# Patient Record
Sex: MALE | Race: WHITE | NOT HISPANIC OR LATINO | URBAN - METROPOLITAN AREA
[De-identification: names, ages, dates, MRNs, and addresses within clinical notes are randomized per-mention and may not be internally consistent; named-entity substitution may affect disease eponyms.]

---

## 2017-02-16 ENCOUNTER — INPATIENT (INPATIENT)
Facility: HOSPITAL | Age: 82
LOS: 3 days | Discharge: HOME | DRG: 378 | End: 2017-02-20
Attending: STUDENT IN AN ORGANIZED HEALTH CARE EDUCATION/TRAINING PROGRAM | Admitting: STUDENT IN AN ORGANIZED HEALTH CARE EDUCATION/TRAINING PROGRAM
Payer: COMMERCIAL

## 2017-02-16 VITALS
OXYGEN SATURATION: 97 % | SYSTOLIC BLOOD PRESSURE: 118 MMHG | DIASTOLIC BLOOD PRESSURE: 68 MMHG | RESPIRATION RATE: 16 BRPM | TEMPERATURE: 98 F | HEART RATE: 70 BPM

## 2017-02-16 DIAGNOSIS — F03.90 UNSPECIFIED DEMENTIA, UNSPECIFIED SEVERITY, WITHOUT BEHAVIORAL DISTURBANCE, PSYCHOTIC DISTURBANCE, MOOD DISTURBANCE, AND ANXIETY: ICD-10-CM

## 2017-02-16 DIAGNOSIS — Z41.8 ENCOUNTER FOR OTHER PROCEDURES FOR PURPOSES OTHER THAN REMEDYING HEALTH STATE: ICD-10-CM

## 2017-02-16 DIAGNOSIS — I10 ESSENTIAL (PRIMARY) HYPERTENSION: ICD-10-CM

## 2017-02-16 DIAGNOSIS — Z66 DO NOT RESUSCITATE: ICD-10-CM

## 2017-02-16 DIAGNOSIS — I48.91 UNSPECIFIED ATRIAL FIBRILLATION: ICD-10-CM

## 2017-02-16 DIAGNOSIS — K92.2 GASTROINTESTINAL HEMORRHAGE, UNSPECIFIED: ICD-10-CM

## 2017-02-16 DIAGNOSIS — N40.0 BENIGN PROSTATIC HYPERPLASIA WITHOUT LOWER URINARY TRACT SYMPTOMS: ICD-10-CM

## 2017-02-16 DIAGNOSIS — R63.8 OTHER SYMPTOMS AND SIGNS CONCERNING FOOD AND FLUID INTAKE: ICD-10-CM

## 2017-02-16 DIAGNOSIS — Z95.0 PRESENCE OF CARDIAC PACEMAKER: Chronic | ICD-10-CM

## 2017-02-16 DIAGNOSIS — E78.5 HYPERLIPIDEMIA, UNSPECIFIED: ICD-10-CM

## 2017-02-16 LAB
ALBUMIN SERPL ELPH-MCNC: 1.3 G/DL — LOW (ref 3.4–5)
ALBUMIN SERPL ELPH-MCNC: 2.7 G/DL — LOW (ref 3.4–5)
ALP SERPL-CCNC: 34 U/L — LOW (ref 40–120)
ALP SERPL-CCNC: 70 U/L — SIGNIFICANT CHANGE UP (ref 40–120)
ALT FLD-CCNC: 10 U/L — LOW (ref 12–42)
ALT FLD-CCNC: 22 U/L — SIGNIFICANT CHANGE UP (ref 12–42)
ANION GAP SERPL CALC-SCNC: 11 MMOL/L — SIGNIFICANT CHANGE UP (ref 9–16)
ANION GAP SERPL CALC-SCNC: 8 MMOL/L — LOW (ref 9–16)
APTT BLD: 26.4 SEC — LOW (ref 27.5–37.4)
APTT BLD: 30.6 SEC — SIGNIFICANT CHANGE UP (ref 27.5–37.4)
AST SERPL-CCNC: 10 U/L — LOW (ref 15–37)
AST SERPL-CCNC: 15 U/L — SIGNIFICANT CHANGE UP (ref 15–37)
BILIRUB SERPL-MCNC: 0.3 MG/DL — SIGNIFICANT CHANGE UP (ref 0.2–1.2)
BILIRUB SERPL-MCNC: 0.3 MG/DL — SIGNIFICANT CHANGE UP (ref 0.2–1.2)
BLD GP AB SCN SERPL QL: NEGATIVE — SIGNIFICANT CHANGE UP
BUN SERPL-MCNC: 13 MG/DL — SIGNIFICANT CHANGE UP (ref 7–23)
BUN SERPL-MCNC: 24 MG/DL — HIGH (ref 7–23)
CALCIUM SERPL-MCNC: 8.4 MG/DL — LOW (ref 8.5–10.5)
CALCIUM SERPL-MCNC: <5 MG/DL — CRITICAL LOW (ref 8.5–10.5)
CHLORIDE SERPL-SCNC: 108 MMOL/L — SIGNIFICANT CHANGE UP (ref 96–108)
CHLORIDE SERPL-SCNC: 126 MMOL/L — HIGH (ref 96–108)
CO2 SERPL-SCNC: 15 MMOL/L — LOW (ref 22–31)
CO2 SERPL-SCNC: 26 MMOL/L — SIGNIFICANT CHANGE UP (ref 22–31)
CREAT SERPL-MCNC: 0.71 MG/DL — SIGNIFICANT CHANGE UP (ref 0.5–1.3)
CREAT SERPL-MCNC: 1.39 MG/DL — HIGH (ref 0.5–1.3)
GLUCOSE SERPL-MCNC: 154 MG/DL — HIGH (ref 70–99)
GLUCOSE SERPL-MCNC: 56 MG/DL — LOW (ref 70–99)
HCT VFR BLD CALC: 26.9 % — LOW (ref 39–50)
HCT VFR BLD CALC: 34.2 % — LOW (ref 39–50)
HCT VFR BLD CALC: 36.4 % — LOW (ref 39–50)
HGB BLD-MCNC: 11.5 G/DL — LOW (ref 13–17)
HGB BLD-MCNC: 12.2 G/DL — LOW (ref 13–17)
HGB BLD-MCNC: 8.5 G/DL — LOW (ref 13–17)
INR BLD: 1.11 — SIGNIFICANT CHANGE UP (ref 0.88–1.16)
INR BLD: 1.39 — HIGH (ref 0.88–1.16)
MCHC RBC-ENTMCNC: 30.6 PG — SIGNIFICANT CHANGE UP (ref 27–34)
MCHC RBC-ENTMCNC: 31.5 PG — SIGNIFICANT CHANGE UP (ref 27–34)
MCHC RBC-ENTMCNC: 31.6 G/DL — LOW (ref 32–36)
MCHC RBC-ENTMCNC: 31.7 PG — SIGNIFICANT CHANGE UP (ref 27–34)
MCHC RBC-ENTMCNC: 33.5 G/DL — SIGNIFICANT CHANGE UP (ref 32–36)
MCHC RBC-ENTMCNC: 33.6 G/DL — SIGNIFICANT CHANGE UP (ref 32–36)
MCV RBC AUTO: 93.7 FL — SIGNIFICANT CHANGE UP (ref 80–100)
MCV RBC AUTO: 94.5 FL — SIGNIFICANT CHANGE UP (ref 80–100)
MCV RBC AUTO: 96.8 FL — SIGNIFICANT CHANGE UP (ref 80–100)
PLATELET # BLD AUTO: 217 K/UL — SIGNIFICANT CHANGE UP (ref 150–400)
PLATELET # BLD AUTO: 312 K/UL — SIGNIFICANT CHANGE UP (ref 150–400)
PLATELET # BLD AUTO: 318 K/UL — SIGNIFICANT CHANGE UP (ref 150–400)
POTASSIUM SERPL-MCNC: 2.3 MMOL/L — CRITICAL LOW (ref 3.5–5.3)
POTASSIUM SERPL-MCNC: 4.1 MMOL/L — SIGNIFICANT CHANGE UP (ref 3.5–5.3)
POTASSIUM SERPL-SCNC: 2.3 MMOL/L — CRITICAL LOW (ref 3.5–5.3)
POTASSIUM SERPL-SCNC: 4.1 MMOL/L — SIGNIFICANT CHANGE UP (ref 3.5–5.3)
PROT SERPL-MCNC: 2.7 G/DL — LOW (ref 6.4–8.2)
PROT SERPL-MCNC: 5.6 G/DL — LOW (ref 6.4–8.2)
PROTHROM AB SERPL-ACNC: 12.3 SEC — SIGNIFICANT CHANGE UP (ref 10–13.1)
PROTHROM AB SERPL-ACNC: 15.5 SEC — HIGH (ref 10–13.1)
RBC # BLD: 2.78 M/UL — LOW (ref 4.2–5.8)
RBC # BLD: 3.65 M/UL — LOW (ref 4.2–5.8)
RBC # BLD: 3.85 M/UL — LOW (ref 4.2–5.8)
RBC # FLD: 13.8 % — SIGNIFICANT CHANGE UP (ref 10.3–16.9)
RBC # FLD: 14.2 % — SIGNIFICANT CHANGE UP (ref 10.3–16.9)
RBC # FLD: 14.4 % — SIGNIFICANT CHANGE UP (ref 10.3–16.9)
RH IG SCN BLD-IMP: POSITIVE — SIGNIFICANT CHANGE UP
SODIUM SERPL-SCNC: 142 MMOL/L — SIGNIFICANT CHANGE UP (ref 135–145)
SODIUM SERPL-SCNC: 152 MMOL/L — HIGH (ref 135–145)
WBC # BLD: 11.2 K/UL — HIGH (ref 3.8–10.5)
WBC # BLD: 6.3 K/UL — SIGNIFICANT CHANGE UP (ref 3.8–10.5)
WBC # BLD: 8.3 K/UL — SIGNIFICANT CHANGE UP (ref 3.8–10.5)
WBC # FLD AUTO: 11.2 K/UL — HIGH (ref 3.8–10.5)
WBC # FLD AUTO: 6.3 K/UL — SIGNIFICANT CHANGE UP (ref 3.8–10.5)
WBC # FLD AUTO: 8.3 K/UL — SIGNIFICANT CHANGE UP (ref 3.8–10.5)

## 2017-02-16 PROCEDURE — 99223 1ST HOSP IP/OBS HIGH 75: CPT

## 2017-02-16 PROCEDURE — 93010 ELECTROCARDIOGRAM REPORT: CPT

## 2017-02-16 PROCEDURE — 99285 EMERGENCY DEPT VISIT HI MDM: CPT | Mod: 25

## 2017-02-16 PROCEDURE — 71010: CPT | Mod: 26

## 2017-02-16 RX ORDER — METOPROLOL TARTRATE 50 MG
1 TABLET ORAL
Qty: 0 | Refills: 0 | COMMUNITY

## 2017-02-16 RX ORDER — SODIUM CHLORIDE 9 MG/ML
1000 INJECTION INTRAMUSCULAR; INTRAVENOUS; SUBCUTANEOUS
Qty: 0 | Refills: 0 | Status: DISCONTINUED | OUTPATIENT
Start: 2017-02-16 | End: 2017-02-16

## 2017-02-16 RX ORDER — SODIUM CHLORIDE 9 MG/ML
250 INJECTION INTRAMUSCULAR; INTRAVENOUS; SUBCUTANEOUS ONCE
Qty: 0 | Refills: 0 | Status: COMPLETED | OUTPATIENT
Start: 2017-02-16 | End: 2017-02-16

## 2017-02-16 RX ORDER — FINASTERIDE 5 MG/1
1 TABLET, FILM COATED ORAL
Qty: 0 | Refills: 0 | COMMUNITY

## 2017-02-16 RX ORDER — LORATADINE 10 MG/1
1 TABLET ORAL
Qty: 0 | Refills: 0 | COMMUNITY

## 2017-02-16 RX ORDER — ATORVASTATIN CALCIUM 80 MG/1
20 TABLET, FILM COATED ORAL AT BEDTIME
Qty: 0 | Refills: 0 | Status: DISCONTINUED | OUTPATIENT
Start: 2017-02-16 | End: 2017-02-20

## 2017-02-16 RX ORDER — DEXTROSE 50 % IN WATER 50 %
50 SYRINGE (ML) INTRAVENOUS ONCE
Qty: 0 | Refills: 0 | Status: DISCONTINUED | OUTPATIENT
Start: 2017-02-16 | End: 2017-02-16

## 2017-02-16 RX ORDER — POTASSIUM CHLORIDE 20 MEQ
10 PACKET (EA) ORAL
Qty: 0 | Refills: 0 | Status: DISCONTINUED | OUTPATIENT
Start: 2017-02-16 | End: 2017-02-16

## 2017-02-16 RX ORDER — ATORVASTATIN CALCIUM 80 MG/1
1 TABLET, FILM COATED ORAL
Qty: 0 | Refills: 0 | COMMUNITY

## 2017-02-16 RX ORDER — SOD SULF/SODIUM/NAHCO3/KCL/PEG
4000 SOLUTION, RECONSTITUTED, ORAL ORAL ONCE
Qty: 0 | Refills: 0 | Status: COMPLETED | OUTPATIENT
Start: 2017-02-16 | End: 2017-02-16

## 2017-02-16 RX ORDER — POTASSIUM CHLORIDE 20 MEQ
60 PACKET (EA) ORAL ONCE
Qty: 0 | Refills: 0 | Status: DISCONTINUED | OUTPATIENT
Start: 2017-02-16 | End: 2017-02-16

## 2017-02-16 RX ORDER — FINASTERIDE 5 MG/1
5 TABLET, FILM COATED ORAL DAILY
Qty: 0 | Refills: 0 | Status: DISCONTINUED | OUTPATIENT
Start: 2017-02-16 | End: 2017-02-20

## 2017-02-16 RX ORDER — LORATADINE 10 MG/1
10 TABLET ORAL DAILY
Qty: 0 | Refills: 0 | Status: DISCONTINUED | OUTPATIENT
Start: 2017-02-16 | End: 2017-02-20

## 2017-02-16 RX ADMIN — Medication 4000 MILLILITER(S): at 20:53

## 2017-02-16 RX ADMIN — SODIUM CHLORIDE 500 MILLILITER(S): 9 INJECTION INTRAMUSCULAR; INTRAVENOUS; SUBCUTANEOUS at 15:48

## 2017-02-16 RX ADMIN — ATORVASTATIN CALCIUM 20 MILLIGRAM(S): 80 TABLET, FILM COATED ORAL at 22:28

## 2017-02-16 RX ADMIN — SODIUM CHLORIDE 125 MILLILITER(S): 9 INJECTION INTRAMUSCULAR; INTRAVENOUS; SUBCUTANEOUS at 17:27

## 2017-02-16 RX ADMIN — SODIUM CHLORIDE 125 MILLILITER(S): 9 INJECTION INTRAMUSCULAR; INTRAVENOUS; SUBCUTANEOUS at 17:25

## 2017-02-16 NOTE — ED ADULT NURSE NOTE - OBJECTIVE STATEMENT
Patient sent in by PCP for bright red rectal bleed that began this morning per the home health aid. Patient is alert only to self and has a history of dimensia. Patient moving all extremities, BLE weak, sensation intact, no facial droop. HRR, S1S2. Crackles auscultated bilateral lung bases, non-productive wet cough. Patient on oxygen 2L nasal cannula. Abdomen soft, non-distended, non-tender. Bowel sounds present throughout. LBM today per home health aid. Patient incontinent of urine and stool. Patient uses wheelchair at home, and home health aid states patient is ambulatory with assistance. Patient denies pain. Bruising on right upper arm, and stage 2 pressure ulcer to sacrum. Will continue to monitor patient.

## 2017-02-16 NOTE — ED PROVIDER NOTE - PMH
Atrial fibrillation    BPH (benign prostatic hyperplasia)    CAD (coronary artery disease)    Cardiomyopathy    CKD (chronic kidney disease)    Dementia    HLD (hyperlipidemia)    HTN (hypertension)    Prostate CA

## 2017-02-16 NOTE — H&P ADULT. - GASTROINTESTINAL DETAILS
no rebound tenderness/no distention/no rigidity/nontender/soft/bowel sounds normal/no guarding/no masses palpable

## 2017-02-16 NOTE — CONSULT NOTE ADULT - ASSESSMENT
PT is 89M pmh alzheimers, AF on xarelto, bifasicular block with PPM, BPH HTN, CKD p/w BRBPR sent from PMD    #hematochezia - bright red blood with brown stool, otherwise asymptomatic and HD stable. Likely hemorrhoidal given exam, but could also be diverticular. MCV is normal, however could be obscured by nutritional deficiency (micro and macrocytosis concurrently). As he has never been screened, malignancy is also a possiblitiy. Stercoral ulcer is unlikely in setting of soft stool on exam. UGIB is very unlikely given presentation, exam and labs. Discussed with HCP, she is willing to suspend DNR for procedure tomorrow  -hold xarelto  -go-lytely (please make sure patient drinks all of prep, he is unlikely to drink by himself, so make sure he has assistance)  -enemas if not coming out clear  -NPO after midnight (clears OK tonight)

## 2017-02-16 NOTE — CONSULT NOTE ADULT - SUBJECTIVE AND OBJECTIVE BOX
PT is 89M pmh alzheimers, AF on xarelto, bifasicular block with PPM, BPH HTN, CKD p/w BRBPR sent from PMD    He is a poor historian so most of the HPI was obtained from aid. Presented to PMD with rectal bleeding who then sent him to ED for large amounts of red blood. Patient had episode of red blood 1 month ago that was self limited. Otherwise no bleeding this month, although had diarrhea related to admission for UTI where he got ABX. No known record of any colonoscopies or EGD. Patient reports no pain or n/v.    REVIEW OF SYSTEMS:  Constitutional: No fever, weight loss or fatigue  ENMT:  No difficulty hearing, tinnitus, vertigo; No sinus or throat pain  Respiratory: No cough, wheezing, chills or hemoptysis  Cardiovascular: No chest pain, palpitations, dizziness or leg swelling  Gastrointestinal: No abdominal or epigastric pain. No nausea, vomiting or hematemesis; No diarrhea or constipation. No melena or hematochezia.  Skin: No itching, burning, rashes or lesions   Musculoskeletal: No joint pain or swelling; No muscle, back or extremity pain    PAST MEDICAL & SURGICAL HISTORY:      FAMILY HISTORY:      SOCIAL HISTORY:  Smoking Status: [ ] Current, [ ] Former, [ ] Never  Pack Years:    MEDICATIONS:  MEDICATIONS  (STANDING):  sodium chloride 0.9%. 1000milliLiter(s) IV Continuous <Continuous>    MEDICATIONS  (PRN):      Allergies    No Known Allergies    Intolerances        Vital Signs Last 24 Hrs  T(C): 36.9, Max: 36.9 (02-16 @ 14:47)  T(F): 98.4, Max: 98.4 (02-16 @ 14:47)  HR: 70 (70 - 70)  BP: 118/68 (118/68 - 118/68)  BP(mean): --  RR: 16 (16 - 16)  SpO2: 97% (97% - 97%)        PHYSICAL EXAM:    General: Well developed; well nourished; in no acute distress  HEENT: dry MM  Gastrointestinal: Soft, non-tender non-distended; Normal bowel sounds; No rebound or guarding  Rectal, Brown stool with red blood below  Extremities: Normal range of motion, No clubbing, cyanosis or edema  Neurological: Alert and oriented x1-2  Skin: Warm and dry. No obvious rash      LABS:                        8.5    6.3   )-----------( 217      ( 16 Feb 2017 15:10 )             26.9     16 Feb 2017 15:10    152    |  126    |  13     ----------------------------<  56     2.3     |  15     |  0.71     Ca    <5.0       16 Feb 2017 15:10    TPro  2.7    /  Alb  1.3    /  TBili  0.3    /  DBili  x      /  AST  10     /  ALT  10     /  AlkPhos  34     16 Feb 2017 15:10        RADIOLOGY & ADDITIONAL STUDIES:

## 2017-02-16 NOTE — H&P ADULT. - HISTORY OF PRESENT ILLNESS
History is obtained from HHA.    89 yo M with PMH dementia, Afib (on Xarelto) s/p PPM, HTN, stage 3 CKD, prostate CA, stage 2 sacral ulcer presented ED from PCP for 1 day of bleeding from rectum. Pt had 1 small BM this morning with bright red blood. Later in afternoon at PCP pt had episode of "voluminous bleeding" and was sent to ED. Similar episode 1 month prior. Denies fever/chills, N/V, chest pain, abdominal pain.     In ED VS T 98.4 HR 70 /70 RR 16 SpO2 97% on RA. Pt received  cc.

## 2017-02-16 NOTE — ED ADULT TRIAGE NOTE - CHIEF COMPLAINT QUOTE
Pt BIBA from clinic c/o intermittent rectal bleeding for the past month. Hx of afib, CKD, HTN, prostate CA. Pt on Xeralta.

## 2017-02-16 NOTE — CONSULT NOTE ADULT - SUBJECTIVE AND OBJECTIVE BOX
HPI:  89yo M with PMHx of dementia, afib on xarelto, CAD, HTN/HLD, s/p PPM, prostate cancer, CKD stage 3, DNR w/niece as HCP presents with BRBPR after acute onset this afternoon.     ...    PAST MEDICAL & SURGICAL HISTORY:  HLD (hyperlipidemia)  CAD (coronary artery disease)  Cardiomyopathy  CKD (chronic kidney disease)  Atrial fibrillation  Dementia  Prostate CA  BPH (benign prostatic hyperplasia)  HTN (hypertension)  Artificial pacemaker    MEDICATIONS:  Xarelto 15mg daily  ASA 81mg daily  Calcium-carbonate-Vit D  finasteride  loratadine  Toprol XL ER 50mg daily  Atorvastatin 40mg daily    sodium chloride 0.9%. 1000milliLiter(s) IV Continuous <Continuous>    ALLERGIES:  No Known Allergies    SOCIAL HISTORY: Denies tobacco/etoh/illicits    FAMILY HISTORY: Refer to admission H&P    Vital Signs Last 24 Hrs  T(C): 36.3, Max: 36.9 (02-16 @ 14:47)  T(F): 97.3, Max: 98.4 (02-16 @ 14:47)  HR: 61 (61 - 70)  BP: 119/72 (118/68 - 119/72)  BP(mean): --  RR: 16 (16 - 16)  SpO2: 100% (97% - 100%)    PHYSICAL EXAM:  Gen: NAD, A&Ox3, appropriate affect, elderly, frail-appearing male  HEENT: nc/at, eomi, CN II-XI grossly intact, mmm  CV: irregular rhythm, regular rate, no M/R/G  Pulm: CTAB, no wheezing/rales/rhonchi  Abdomen: Soft, NT/ND, no rebound/guarding, +BS  Ext/Pulses: 2+ DP/PT b/l, no c/c/e, wwp  Rectal: soft stool in vault, red blood on finger, internal hemorrhoids felt on palpation, some sphincter patulence, minimal smears of bright red blood on diaper    LABS:                        11.5   8.3   )-----------( 318      ( 16 Feb 2017 16:32 )             34.2     16 Feb 2017 16:32    142    |  108    |  24     ----------------------------<  154    4.1     |  26     |  1.39     Ca    8.4        16 Feb 2017 16:32    TPro  5.6    /  Alb  2.7    /  TBili  0.3    /  DBili  x      /  AST  15     /  ALT  22     /  AlkPhos  70     16 Feb 2017 16:32    PT/INR - ( 16 Feb 2017 16:32 )   PT: 12.3 sec;   INR: 1.11       PTT - ( 16 Feb 2017 16:32 )  PTT:30.6 sec    RADIOLOGY & ADDITIONAL STUDIES: None HPI:  89yo M with PMHx of dementia, afib on xarelto, CAD, HTN/HLD, s/p PPM, prostate cancer, CKD stage 3, DNR w/niece as HCP presents with BRBPR after acute onset this afternoon. Patient was taken to see his PCP after this episode.  Per PCP, there was a "voluminous" amount of blood when he performed a rectal exam so patient was sent to the ED.  Per patient's home aide, there was a prior episode of bleeding one month ago, but she is unaware of how significant the bleeding was at that time and it resolved on its own.  Patient denies fevers/chills/nausea/vomiting/abdominal pain/cp/sob.  Unknown whether patient has had recent colonoscopy/EGD.  In ED VS T 98.4 HR 70 /70 RR 16 SpO2 97% on RA. Pt received  cc.    PAST MEDICAL & SURGICAL HISTORY:  HLD (hyperlipidemia)  CAD (coronary artery disease)  Cardiomyopathy  CKD (chronic kidney disease)  Atrial fibrillation  Dementia  Prostate CA  BPH (benign prostatic hyperplasia)  HTN (hypertension)  Artificial pacemaker    MEDICATIONS:  Xarelto 15mg daily  ASA 81mg daily  Calcium-carbonate-Vit D  finasteride  loratadine  Toprol XL ER 50mg daily  Atorvastatin 40mg daily    sodium chloride 0.9%. 1000milliLiter(s) IV Continuous <Continuous>    ALLERGIES:  No Known Allergies    SOCIAL HISTORY: Denies tobacco/etoh/illicits    FAMILY HISTORY: Refer to admission H&P    Vital Signs Last 24 Hrs  T(C): 36.3, Max: 36.9 (02-16 @ 14:47)  T(F): 97.3, Max: 98.4 (02-16 @ 14:47)  HR: 61 (61 - 70)  BP: 119/72 (118/68 - 119/72)  BP(mean): --  RR: 16 (16 - 16)  SpO2: 100% (97% - 100%)    PHYSICAL EXAM:  Gen: NAD, A&Ox3, appropriate affect, elderly, frail-appearing male  HEENT: nc/at, eomi, CN II-XI grossly intact, mmm  CV: irregular rhythm, regular rate, no M/R/G  Pulm: CTAB, no wheezing/rales/rhonchi  Abdomen: Soft, NT/ND, no rebound/guarding, +BS  Ext/Pulses: 2+ DP/PT b/l, no c/c/e, wwp  Rectal: soft stool in vault, red blood on finger, internal hemorrhoids felt on palpation, some sphincter patulence, minimal smears of bright red blood on diaper    LABS:                        11.5   8.3   )-----------( 318      ( 16 Feb 2017 16:32 )             34.2     16 Feb 2017 16:32    142    |  108    |  24     ----------------------------<  154    4.1     |  26     |  1.39     Ca    8.4        16 Feb 2017 16:32    TPro  5.6    /  Alb  2.7    /  TBili  0.3    /  DBili  x      /  AST  15     /  ALT  22     /  AlkPhos  70     16 Feb 2017 16:32    PT/INR - ( 16 Feb 2017 16:32 )   PT: 12.3 sec;   INR: 1.11       PTT - ( 16 Feb 2017 16:32 )  PTT:30.6 sec    RADIOLOGY & ADDITIONAL STUDIES: None HPI:  89yo M with PMHx of dementia, afib on xarelto, CAD, HTN/HLD, s/p PPM, prostate cancer, CKD stage 3, DNR w/niece as HCP presents with BRBPR after acute onset this afternoon. Patient was taken to see his PCP after this episode.  Per PCP, there was a "voluminous" amount of blood when he performed a rectal exam so patient was sent to the ED.  Per patient's home aide, there was a prior episode of bleeding one month ago, but she is unaware of how significant the bleeding was at that time and it resolved on its own.  Patient denies fevers/chills/nausea/vomiting/abdominal pain/cp/sob.  Unknown whether patient has had recent colonoscopy/EGD.  In ED VS T 98.4 HR 70 /70 RR 16 SpO2 97% on RA. Pt received  cc.    Patient lives at home with 3 rotating home aides.HCP is niece, Ms. Anny Plummer ([phone]).  Spoke with HCP about whether she would consider surgical intervention if needed.  HCP's mother (patient's sister) had just passed away several days ago, so she expressed that she was not currently in a state of mind to make that decision but that she would think about it.      PAST MEDICAL & SURGICAL HISTORY:  HLD (hyperlipidemia)  CAD (coronary artery disease)  Cardiomyopathy  CKD (chronic kidney disease)  Atrial fibrillation  Dementia  Prostate CA  BPH (benign prostatic hyperplasia)  HTN (hypertension)  Artificial pacemaker    MEDICATIONS:  Xarelto 15mg daily  ASA 81mg daily  Calcium-carbonate-Vit D  finasteride  loratadine  Toprol XL ER 50mg daily  Atorvastatin 40mg daily    sodium chloride 0.9%. 1000milliLiter(s) IV Continuous <Continuous>    ALLERGIES:  No Known Allergies    SOCIAL HISTORY: Denies tobacco/etoh/illicits    FAMILY HISTORY: Refer to admission H&P    Vital Signs Last 24 Hrs  T(C): 36.3, Max: 36.9 (02-16 @ 14:47)  T(F): 97.3, Max: 98.4 (02-16 @ 14:47)  HR: 61 (61 - 70)  BP: 119/72 (118/68 - 119/72)  BP(mean): --  RR: 16 (16 - 16)  SpO2: 100% (97% - 100%)    PHYSICAL EXAM:  Gen: NAD, A&Ox3, appropriate affect, elderly, frail-appearing male  HEENT: nc/at, eomi, CN II-XI grossly intact, mmm  CV: irregular rhythm, regular rate, no M/R/G  Pulm: CTAB, no wheezing/rales/rhonchi  Abdomen: Soft, NT/ND, no rebound/guarding, +BS  Ext/Pulses: 2+ DP/PT b/l, no c/c/e, wwp  Rectal: soft stool in vault, red blood on finger, internal hemorrhoids felt on palpation, some sphincter patulence, minimal smears of bright red blood on diaper    LABS:                        11.5   8.3   )-----------( 318      ( 16 Feb 2017 16:32 )             34.2     16 Feb 2017 16:32    142    |  108    |  24     ----------------------------<  154    4.1     |  26     |  1.39     Ca    8.4        16 Feb 2017 16:32    TPro  5.6    /  Alb  2.7    /  TBili  0.3    /  DBili  x      /  AST  15     /  ALT  22     /  AlkPhos  70     16 Feb 2017 16:32    PT/INR - ( 16 Feb 2017 16:32 )   PT: 12.3 sec;   INR: 1.11       PTT - ( 16 Feb 2017 16:32 )  PTT:30.6 sec    RADIOLOGY & ADDITIONAL STUDIES: None

## 2017-02-16 NOTE — H&P ADULT. - PROBLEM SELECTOR PLAN 2
Rate controlled. Cont metoprolol. Rate controlled. Will hold Metoprolol in setting of active bleeding. Holding Xarelto and ASA in setting of bleed.

## 2017-02-16 NOTE — H&P ADULT. - ASSESSMENT
89 yo M with PMH dementia, Afib (on Xarelto) w/ PPM, HTN, CKD3, BPH, prostate CA presented to ED from PCP with 1 day of BRBPR.

## 2017-02-16 NOTE — H&P ADULT. - PROBLEM SELECTOR PLAN 1
3 episodes BRBPR today. Last colonoscopy unknown. GI and surgery consulted in ED. Plan for colonoscopy tomorrow. HCP (niece) not amenable to surgery. DNR will be reversed for procedure tomorrow. 3 episodes BRBPR today. Last colonoscopy unknown. With bright blood in rectal vault, possible hemorrhoidal bleed vs diverticular bleed vs AVM. Unlikely brisk upper bleed, patient hemodynamically stable at this time.     GI and surgery consulted in ED. Plan for colonoscopy tomorrow. HCP (niece) not amenable to surgery. DNR will be reversed for procedure tomorrow.  - will monitor CBC q8h. Transfuse if Hgb <7. Maintain active T&S  - check orthostatics  - received IV fluids in ED  - clear liquids this evening and to start GoLytely prep until clear stools, enemas if needed. NPO after midnight for scope

## 2017-02-16 NOTE — H&P ADULT. - ATTENDING COMMENTS
89M hx dementia, Afib (on Xarelto) s/p PPM, HTN, CKD, prostate ca, sacral decub, HTN, BPH, CAD on ASA who presents with BRBPR which began this morning. Pt unable to give history ("I don't remember having bleeding"), but admitting team was able to speak with HHRADHA who confirms that pt had 1 episode of small stool with bright red blood in AM, then went to PMD, where he reportedly had "voluminous blood." Pt denies any complaints on my interview. HD stable in ED. Bright red blood noted on rectal exam with brown stool and no obvious hemorrhoids. GI and surgery consulted in ED and plan for C-scope tomorrow. Pt does not recall ever having colonoscopy.  AFVSS  Agree with exam as above  Labs and imaging reviewed  A/P: 1) Hematochezia--DDx includes hemorrhoids vs diverticular bleed vs ectasia vs malignancy. Hgb stable and HD stable. Will hold ASA and Xarelto for now given bleeding. Prep tonight for C-scope tomorrow. Trend CBC, tx Hgb <8. Active T&S and adequate IV access. F/u additional GI recs.  2) A fib--Will hold Xarelto as above. Will also hold Metoprolol given bleeding, but may restart if becomes tachycardic as pt HD stable.  3) HTN--BP stable. Holding Metoprolol as above.  4) CKD--Unknown baseline Cr, but not significantly elevated. Monitor for now.  5) BPH--Continue Proscar.  6) Sacral Decub--Wound care.  7) CAD--Holding ASA and Metoprolol as above. Restart when appropriate. 89M hx dementia, Afib (on Xarelto) s/p PPM, HTN, CKD, prostate ca, sacral decub, HTN, BPH, CAD on ASA who presents with BRBPR which began this morning. Pt unable to give history ("I don't remember having bleeding"), but admitting team was able to speak with MANNIE who confirms that pt had 1 episode of small stool with bright red blood in AM, then went to PMD, where he reportedly had "voluminous blood." Pt denies any complaints on my interview. HD stable in ED. Bright red blood noted on rectal exam with brown stool and no obvious hemorrhoids. GI and surgery consulted in ED and plan for C-scope tomorrow. Pt does not recall ever having colonoscopy. (NOTE: First set of labs drawn in ED were drawn immediately downstream from IV running NS, so invalid. Repeat labs are valid.)  AFVSS  Agree with exam as above  Labs and imaging reviewed  A/P: 1) Hematochezia--DDx includes hemorrhoids vs diverticular bleed vs ectasia vs malignancy. Hgb stable and HD stable. Will hold ASA and Xarelto for now given bleeding. Prep tonight for C-scope tomorrow. Trend CBC, tx Hgb <8. Active T&S and adequate IV access. F/u additional GI recs.  2) A fib--Will hold Xarelto as above. Will also hold Metoprolol given bleeding, but may restart if becomes tachycardic as pt HD stable.  3) HTN--BP stable. Holding Metoprolol as above.  4) CKD--Unknown baseline Cr, but not significantly elevated. Monitor for now.  5) BPH--Continue Proscar.  6) Sacral Decub--Wound care.  7) CAD--Holding ASA and Metoprolol as above. Restart when appropriate.

## 2017-02-16 NOTE — ED PROVIDER NOTE - MEDICAL DECISION MAKING DETAILS
89 yo male on xarelto c/o rectal bleeding.  VSS.  No abd or rectal pain.  Will check labs, gi and surg consult, plan for admit.

## 2017-02-16 NOTE — ED PROVIDER NOTE - PROGRESS NOTE DETAILS
Discussed w pmd Dr Dixie Kelsey pt was supposed to go to [facility] but came here.  No preference for surg or gi.  Surg and GI consulted and will eval. Discussed w pmd Dr Dixie Kelsey pt was supposed to go to [facility] but came here.  No preference for surg or gi.  Surg and GI consulted and will eval.  Per pmd, unknown prior colonoscopy, last hgb 16.3 in Nov 2015.  PMD prefers hospitalist admit. Labs w abnl results - suspect nacl contamination - labs drawn from above running iv.  Will repeat and also do poc.  's - not c/w 56 on chem 7 results. Pt w repeat labs - hgb 11, k 4.1, other labs wnl.  Pt seen by surg - no intervention needed at this time; also by gi - will follow as consult and plan for colonoscopy.  Will admit to med.

## 2017-02-16 NOTE — CONSULT NOTE ADULT - ASSESSMENT
89yo M with PMHx of dementia, afib on xarelto, CAD, HTN/HLD, s/p PPM, prostate cancer, CKD stage 3, DNR w/niece as HCP presents with BRBPR after acute onset this afternoon. Hgb on admission 11.5, patient afebrile, HDS.      - Agree with admission to medicine  - No current surgical indication; patient HDS, hgb within acceptable range  - Recommend trending CBC; transfuse for hgb<7.0  - GI to scope patient tomorrow; **PLEASE** follow their advice and ensure that patient appropriately prepped overnight  - Spoke with HCP, MsRadhika Anny Elian, she is not sure whether she would pursue a surgical option in the event that patient required surgery  - Surgery to follow

## 2017-02-16 NOTE — ED ADULT NURSE NOTE - ED STAT RN HANDOFF DETAILS
Patient relocated to ED holding to await bed on unit for admission. No s/s acute distress noted. Handoff report given to ER holding JENN Henley for continuation of care.

## 2017-02-17 LAB
ANION GAP SERPL CALC-SCNC: 9 MMOL/L — SIGNIFICANT CHANGE UP (ref 9–16)
BLD GP AB SCN SERPL QL: NEGATIVE — SIGNIFICANT CHANGE UP
BUN SERPL-MCNC: 20 MG/DL — SIGNIFICANT CHANGE UP (ref 7–23)
CALCIUM SERPL-MCNC: 8.5 MG/DL — SIGNIFICANT CHANGE UP (ref 8.5–10.5)
CHLORIDE SERPL-SCNC: 112 MMOL/L — HIGH (ref 96–108)
CO2 SERPL-SCNC: 26 MMOL/L — SIGNIFICANT CHANGE UP (ref 22–31)
CREAT SERPL-MCNC: 1.29 MG/DL — SIGNIFICANT CHANGE UP (ref 0.5–1.3)
GLUCOSE SERPL-MCNC: 94 MG/DL — SIGNIFICANT CHANGE UP (ref 70–99)
HCT VFR BLD CALC: 36.7 % — LOW (ref 39–50)
HGB BLD-MCNC: 12.6 G/DL — LOW (ref 13–17)
MAGNESIUM SERPL-MCNC: 2.1 MG/DL — SIGNIFICANT CHANGE UP (ref 1.6–2.4)
MCHC RBC-ENTMCNC: 32.6 PG — SIGNIFICANT CHANGE UP (ref 27–34)
MCHC RBC-ENTMCNC: 34.3 G/DL — SIGNIFICANT CHANGE UP (ref 32–36)
MCV RBC AUTO: 95.1 FL — SIGNIFICANT CHANGE UP (ref 80–100)
PLATELET # BLD AUTO: 331 K/UL — SIGNIFICANT CHANGE UP (ref 150–400)
POTASSIUM SERPL-MCNC: 4.1 MMOL/L — SIGNIFICANT CHANGE UP (ref 3.5–5.3)
POTASSIUM SERPL-SCNC: 4.1 MMOL/L — SIGNIFICANT CHANGE UP (ref 3.5–5.3)
RBC # BLD: 3.86 M/UL — LOW (ref 4.2–5.8)
RBC # FLD: 14.3 % — SIGNIFICANT CHANGE UP (ref 10.3–16.9)
RH IG SCN BLD-IMP: POSITIVE — SIGNIFICANT CHANGE UP
SODIUM SERPL-SCNC: 147 MMOL/L — HIGH (ref 135–145)
WBC # BLD: 16.5 K/UL — HIGH (ref 3.8–10.5)
WBC # FLD AUTO: 16.5 K/UL — HIGH (ref 3.8–10.5)

## 2017-02-17 PROCEDURE — 99233 SBSQ HOSP IP/OBS HIGH 50: CPT | Mod: GC

## 2017-02-17 RX ORDER — SODIUM CHLORIDE 9 MG/ML
1000 INJECTION, SOLUTION INTRAVENOUS
Qty: 0 | Refills: 0 | Status: DISCONTINUED | OUTPATIENT
Start: 2017-02-17 | End: 2017-02-20

## 2017-02-17 RX ADMIN — SODIUM CHLORIDE 70 MILLILITER(S): 9 INJECTION, SOLUTION INTRAVENOUS at 12:33

## 2017-02-17 RX ADMIN — LORATADINE 10 MILLIGRAM(S): 10 TABLET ORAL at 12:26

## 2017-02-17 RX ADMIN — ATORVASTATIN CALCIUM 20 MILLIGRAM(S): 80 TABLET, FILM COATED ORAL at 21:35

## 2017-02-17 RX ADMIN — FINASTERIDE 5 MILLIGRAM(S): 5 TABLET, FILM COATED ORAL at 12:26

## 2017-02-17 NOTE — DISCHARGE NOTE ADULT - PATIENT PORTAL LINK FT
“You can access the [facility] Patient Portal, offered by [facility], by registering with the following website: [email or web address]”

## 2017-02-17 NOTE — PATIENT PROFILE ADULT. - NS PRO CONTRA FLU 1
unable to assess immunization status/patient unable to recall whether he had the shot or not yes/patient unable to recall when he had the shot

## 2017-02-17 NOTE — DISCHARGE NOTE ADULT - PLAN OF CARE
stop Xarelto You presented with blood in your stool. GI performed a colonscopy and found . Continue at home with your aide. Stop taking Xarelto as you are bleeding. Please follow up with your doctor. You presented with blood in your stool. GI performed a colonoscopy and found diverticulosis, requiring no intervention. you will be discharged off of your blood thinners (stop taking aspirin and xarelto)

## 2017-02-17 NOTE — PROGRESS NOTE ADULT - ASSESSMENT
BRBPR. Hb stable. Now resolved. Likely diverticular vs hemorrhoidal bleed.  - f/u results of colonoscopy  - questions? call

## 2017-02-17 NOTE — DISCHARGE NOTE ADULT - CARE PROVIDER_API CALL
Sree Colin), Internal Medicine; Pediatrics  [address] [place] [address]  Phone: [phone]  Fax: [phone]

## 2017-02-17 NOTE — DISCHARGE NOTE ADULT - MEDICATION SUMMARY - MEDICATIONS TO TAKE
I will START or STAY ON the medications listed below when I get home from the hospital:    finasteride 5 mg oral tablet  -- 1 tab(s) by mouth once a day  -- Indication: For BPH (benign prostatic hyperplasia)    loratadine 10 mg oral tablet  -- 1 tab(s) by mouth once a day  -- Indication: For Allergies    atorvastatin 20 mg oral tablet  -- 1 tab(s) by mouth once a day  -- Indication: For HLD (hyperlipidemia)    Toprol-XL 50 mg oral tablet, extended release  -- 1 tab(s) by mouth once a day  -- Indication: For Hypertension

## 2017-02-17 NOTE — DISCHARGE NOTE ADULT - MEDICATION SUMMARY - MEDICATIONS TO STOP TAKING
I will STOP taking the medications listed below when I get home from the hospital:    Xarelto 15 mg oral tablet  -- 1 tab(s) by mouth once a day (in the evening)    Aspirin Enteric Coated 81 mg oral delayed release tablet  -- 1 tab(s) by mouth once a day

## 2017-02-17 NOTE — DISCHARGE NOTE ADULT - HOSPITAL COURSE
89 yo M with PMH dementia, Afib (on Xarelto) s/p PPM, HTN, stage 3 CKD, prostate CA, stage 2 sacral ulcer presented ED from PCP for 1 day of bleeding from rectum. Pt had 1 small BM this morning with bright red blood. Later in afternoon at PCP pt had episode of "voluminous bleeding" and was sent to ED. Similar episode 1 month prior. Denies fever/chills, N/V, chest pain, abdominal pain. In ED VS T 98.4 HR 70 /70 RR 16 SpO2 97% on RA. Pt received  cc. He was taken off of Xarelto and Aspirin for the bleeding. GI and Colorectal surgery were consulted. He was prepped for colonoscopy and the DNR/DNI order was rescinded for the procedure only. Colonoscopy was performed and 89 yo M with PMH dementia, Afib (on Xarelto) s/p PPM, HTN, stage 3 CKD, prostate CA, stage 2 sacral ulcer presented ED from PCP for 1 day of bleeding from rectum. Pt had 1 small BM this morning with bright red blood. Later in afternoon at PCP pt had episode of "voluminous bleeding" and was sent to ED. Similar episode 1 month prior. Denies fever/chills, N/V, chest pain, abdominal pain. In ED VS T 98.4 HR 70 /70 RR 16 SpO2 97% on RA. Pt received  cc. He was taken off of Xarelto and Aspirin for the bleeding. GI and Colorectal surgery were consulted. He was prepped for colonoscopy and the DNR/DNI order was rescinded for the procedure only. Colonoscopy was performed and found diverticulosis with no need for intervention. The patient was also found to have an TONE, with  a FeNa of 1.6%. A renal US was performed and found ____. 89 yo M with PMH dementia, Afib (on Xarelto) s/p PPM, HTN, stage 3 CKD, prostate CA, stage 2 sacral ulcer presented ED from PCP for 1 day of bleeding from rectum. Pt had 1 small BM this morning with bright red blood. Later in afternoon at PCP pt had episode of "voluminous bleeding" and was sent to ED. Similar episode 1 month prior. Denies fever/chills, N/V, chest pain, abdominal pain. In ED VS T 98.4 HR 70 /70 RR 16 SpO2 97% on RA. Pt received  cc. He was taken off of Xarelto and Aspirin for the bleeding. GI and Colorectal surgery were consulted. He was prepped for colonoscopy and the DNR/DNI order was rescinded for the procedure only. Colonoscopy was performed and found diverticulosis with no need for intervention. The patient was also found to have an TONE, with  a FeNa of 1.6%. A renal US was performed and found Multiple bladder diverticula. 0.9 cm stone within one of these diverticula. No hydronephrosis or renal calculi. Cr likely elevated due to chronic kidney disease. Can follow up as outpatient. He is hemodynamically stable for discharge.

## 2017-02-17 NOTE — PROGRESS NOTE ADULT - ASSESSMENT
Lower GI bleeding seems to have stopped with cessation of anticoagulation.  Diverticular vs hemorrhoidal origin.  For colonoscopy today.  Favor no further anticoagulation    Dr. Reddy assuming care this evening. Lower GI bleeding seems to have stopped with cessation of anticoagulation.  Diverticular vs hemorrhoidal origin.  For colonoscopy today.  Favor no further anticoagulation    Not a good operative candidate.  Please reconsult as needed.

## 2017-02-17 NOTE — PROGRESS NOTE ADULT - ASSESSMENT
89 yo M with PMH dementia, Afib (on Xarelto) w/ PPM, HTN, CKD3, BPH, prostate CA presented to ED from PCP with 1 day of BRBPR.

## 2017-02-17 NOTE — DISCHARGE NOTE ADULT - CARE PLAN
Principal Discharge DX:	Lower GI bleed  Goal:	stop Xarelto  Instructions for follow-up, activity and diet:	You presented with blood in your stool. GI performed a colonscopy and found .  Secondary Diagnosis:	Dementia  Instructions for follow-up, activity and diet:	Continue at home with your aide.  Secondary Diagnosis:	Chronic atrial fibrillation  Instructions for follow-up, activity and diet:	Stop taking Xarelto as you are bleeding. Please follow up with your doctor. Principal Discharge DX:	Lower GI bleed  Goal:	stop Xarelto  Instructions for follow-up, activity and diet:	You presented with blood in your stool. GI performed a colonoscopy and found diverticulosis, requiring no intervention. you will be discharged off of your blood thinners (stop taking aspirin and xarelto)  Secondary Diagnosis:	Dementia  Instructions for follow-up, activity and diet:	Continue at home with your aide.  Secondary Diagnosis:	Chronic atrial fibrillation  Instructions for follow-up, activity and diet:	Stop taking Xarelto as you are bleeding. Please follow up with your doctor.

## 2017-02-17 NOTE — PATIENT PROFILE ADULT. - VISION (WITH CORRECTIVE LENSES IF THE PATIENT USUALLY WEARS THEM):
Partially impaired: cannot see medication labels or newsprint, but can see obstacles in path, and the surrounding layout; can count fingers at arm's length/prescription eyeglasses

## 2017-02-18 LAB
ANION GAP SERPL CALC-SCNC: 8 MMOL/L — LOW (ref 9–16)
BUN SERPL-MCNC: 21 MG/DL — SIGNIFICANT CHANGE UP (ref 7–23)
CALCIUM SERPL-MCNC: 8.9 MG/DL — SIGNIFICANT CHANGE UP (ref 8.5–10.5)
CHLORIDE SERPL-SCNC: 110 MMOL/L — HIGH (ref 96–108)
CO2 SERPL-SCNC: 26 MMOL/L — SIGNIFICANT CHANGE UP (ref 22–31)
CREAT SERPL-MCNC: 1.38 MG/DL — HIGH (ref 0.5–1.3)
GLUCOSE SERPL-MCNC: 94 MG/DL — SIGNIFICANT CHANGE UP (ref 70–99)
HCT VFR BLD CALC: 33.3 % — LOW (ref 39–50)
HGB BLD-MCNC: 11.1 G/DL — LOW (ref 13–17)
MAGNESIUM SERPL-MCNC: 2.2 MG/DL — SIGNIFICANT CHANGE UP (ref 1.6–2.4)
MCHC RBC-ENTMCNC: 31.4 PG — SIGNIFICANT CHANGE UP (ref 27–34)
MCHC RBC-ENTMCNC: 33.3 G/DL — SIGNIFICANT CHANGE UP (ref 32–36)
MCV RBC AUTO: 94.3 FL — SIGNIFICANT CHANGE UP (ref 80–100)
PLATELET # BLD AUTO: 322 K/UL — SIGNIFICANT CHANGE UP (ref 150–400)
POTASSIUM SERPL-MCNC: 3.6 MMOL/L — SIGNIFICANT CHANGE UP (ref 3.5–5.3)
POTASSIUM SERPL-SCNC: 3.6 MMOL/L — SIGNIFICANT CHANGE UP (ref 3.5–5.3)
RBC # BLD: 3.53 M/UL — LOW (ref 4.2–5.8)
RBC # FLD: 14.7 % — SIGNIFICANT CHANGE UP (ref 10.3–16.9)
SODIUM SERPL-SCNC: 144 MMOL/L — SIGNIFICANT CHANGE UP (ref 135–145)
WBC # BLD: 15 K/UL — HIGH (ref 3.8–10.5)
WBC # FLD AUTO: 15 K/UL — HIGH (ref 3.8–10.5)

## 2017-02-18 PROCEDURE — 99233 SBSQ HOSP IP/OBS HIGH 50: CPT | Mod: GC

## 2017-02-18 RX ADMIN — LORATADINE 10 MILLIGRAM(S): 10 TABLET ORAL at 12:33

## 2017-02-18 RX ADMIN — ATORVASTATIN CALCIUM 20 MILLIGRAM(S): 80 TABLET, FILM COATED ORAL at 22:06

## 2017-02-18 RX ADMIN — FINASTERIDE 5 MILLIGRAM(S): 5 TABLET, FILM COATED ORAL at 09:41

## 2017-02-18 RX ADMIN — SODIUM CHLORIDE 70 MILLILITER(S): 9 INJECTION, SOLUTION INTRAVENOUS at 02:41

## 2017-02-18 NOTE — PROGRESS NOTE ADULT - ASSESSMENT
89yo M with PMHx of dementia, afib on xarelto, CAD, HTN/HLD, s/p PPM, prostate cancer, CKD stage 3, DNR w/niece as HCP presents with BRBPR after acute onset this afternoon. Hgb on admission 11.5, patient afebrile, HDS.      -No active bleeding apparent on recent colonoscopy. Only significant for diverticulosis  -Hbg stable  -Plan as per primary team will continue to follow up

## 2017-02-18 NOTE — PROGRESS NOTE ADULT - ASSESSMENT
PT is 89M pmh alzheimers, AF on xarelto, bifasicular block with PPM, BPH HTN, CKD p/w BRBPR sent from PMD for hematochezia. Pt s/p colonoscopy showing pan-diverticulosis without active source of bleeding. Pt remains HD stable without drop in hgb.    #hematochezia   -Colon: Pand-diverticulosis   -CBC's stable -- continue to monitor  -Resume diet + aspiration precautions     GI will sign off at this time, please reconsult as needed.

## 2017-02-19 DIAGNOSIS — N17.9 ACUTE KIDNEY FAILURE, UNSPECIFIED: ICD-10-CM

## 2017-02-19 LAB
ANION GAP SERPL CALC-SCNC: 9 MMOL/L — SIGNIFICANT CHANGE UP (ref 9–16)
APPEARANCE UR: CLEAR — SIGNIFICANT CHANGE UP
BILIRUB UR-MCNC: NEGATIVE — SIGNIFICANT CHANGE UP
BUN SERPL-MCNC: 21 MG/DL — SIGNIFICANT CHANGE UP (ref 7–23)
CALCIUM SERPL-MCNC: 8.3 MG/DL — LOW (ref 8.5–10.5)
CHLORIDE SERPL-SCNC: 111 MMOL/L — HIGH (ref 96–108)
CO2 SERPL-SCNC: 23 MMOL/L — SIGNIFICANT CHANGE UP (ref 22–31)
COLOR SPEC: YELLOW — SIGNIFICANT CHANGE UP
CREAT ?TM UR-MCNC: 68.9 MG/DL — SIGNIFICANT CHANGE UP
CREAT SERPL-MCNC: 1.5 MG/DL — HIGH (ref 0.5–1.3)
DIFF PNL FLD: (no result)
GLUCOSE SERPL-MCNC: 87 MG/DL — SIGNIFICANT CHANGE UP (ref 70–99)
GLUCOSE UR QL: NEGATIVE — SIGNIFICANT CHANGE UP
HCT VFR BLD CALC: 35.1 % — LOW (ref 39–50)
HGB BLD-MCNC: 11.6 G/DL — LOW (ref 13–17)
KETONES UR-MCNC: NEGATIVE — SIGNIFICANT CHANGE UP
LEUKOCYTE ESTERASE UR-ACNC: NEGATIVE — SIGNIFICANT CHANGE UP
MAGNESIUM SERPL-MCNC: 2.1 MG/DL — SIGNIFICANT CHANGE UP (ref 1.6–2.4)
MCHC RBC-ENTMCNC: 31.2 PG — SIGNIFICANT CHANGE UP (ref 27–34)
MCHC RBC-ENTMCNC: 33 G/DL — SIGNIFICANT CHANGE UP (ref 32–36)
MCV RBC AUTO: 94.4 FL — SIGNIFICANT CHANGE UP (ref 80–100)
NITRITE UR-MCNC: NEGATIVE — SIGNIFICANT CHANGE UP
PH UR: 7 — SIGNIFICANT CHANGE UP (ref 4–8)
PLATELET # BLD AUTO: 260 K/UL — SIGNIFICANT CHANGE UP (ref 150–400)
POTASSIUM SERPL-MCNC: 3.9 MMOL/L — SIGNIFICANT CHANGE UP (ref 3.5–5.3)
POTASSIUM SERPL-SCNC: 3.9 MMOL/L — SIGNIFICANT CHANGE UP (ref 3.5–5.3)
PROT UR-MCNC: NEGATIVE MG/DL — SIGNIFICANT CHANGE UP
RBC # BLD: 3.72 M/UL — LOW (ref 4.2–5.8)
RBC # FLD: 14.3 % — SIGNIFICANT CHANGE UP (ref 10.3–16.9)
SODIUM SERPL-SCNC: 143 MMOL/L — SIGNIFICANT CHANGE UP (ref 135–145)
SODIUM UR-SCNC: 102 MMOL/L — SIGNIFICANT CHANGE UP
SP GR SPEC: 1.01 — SIGNIFICANT CHANGE UP (ref 1–1.03)
UROBILINOGEN FLD QL: 0.2 E.U./DL — SIGNIFICANT CHANGE UP
WBC # BLD: 13.6 K/UL — HIGH (ref 3.8–10.5)
WBC # FLD AUTO: 13.6 K/UL — HIGH (ref 3.8–10.5)

## 2017-02-19 PROCEDURE — 99233 SBSQ HOSP IP/OBS HIGH 50: CPT | Mod: GC

## 2017-02-19 RX ORDER — ASPIRIN/CALCIUM CARB/MAGNESIUM 324 MG
1 TABLET ORAL
Qty: 0 | Refills: 0 | COMMUNITY

## 2017-02-19 RX ORDER — FONDAPARINUX SODIUM 2.5 MG/.5ML
1 INJECTION, SOLUTION SUBCUTANEOUS
Qty: 0 | Refills: 0 | COMMUNITY

## 2017-02-19 RX ADMIN — SODIUM CHLORIDE 70 MILLILITER(S): 9 INJECTION, SOLUTION INTRAVENOUS at 01:58

## 2017-02-19 RX ADMIN — FINASTERIDE 5 MILLIGRAM(S): 5 TABLET, FILM COATED ORAL at 12:20

## 2017-02-19 RX ADMIN — LORATADINE 10 MILLIGRAM(S): 10 TABLET ORAL at 12:20

## 2017-02-19 RX ADMIN — ATORVASTATIN CALCIUM 20 MILLIGRAM(S): 80 TABLET, FILM COATED ORAL at 21:03

## 2017-02-19 NOTE — PROGRESS NOTE ADULT - PROVIDER SPECIALTY LIST ADULT
Colorectal Surgery
Gastroenterology
Hospitalist
Hospitalist
Internal Medicine
Internal Medicine
Surgery
Surgery
Hospitalist

## 2017-02-19 NOTE — PROGRESS NOTE ADULT - PROBLEM SELECTOR PLAN 3
AAOx1, at baseline mental status currently
AAOx1, stable. Known to PCP.
AAOx1, stable. Known to PCP.

## 2017-02-19 NOTE — PROGRESS NOTE ADULT - PROBLEM SELECTOR PROBLEM 5
HLD (hyperlipidemia)

## 2017-02-19 NOTE — PROGRESS NOTE ADULT - ATTENDING COMMENTS
TONE: FeNa 1.6%. Does not appear dehydrated. Meds reviewed. Check renal U/S and bladder scan PVR.
TONE: Unclear etiology. Check urine lytes

## 2017-02-19 NOTE — PROGRESS NOTE ADULT - PROBLEM SELECTOR PLAN 6
continue home Finasteride. FenA 1.6%, bladder scan with 200 cc, no signs of retention of obstruction but will get US    - f/u renal US

## 2017-02-19 NOTE — PROGRESS NOTE ADULT - PROBLEM SELECTOR PLAN 4
BB
BB
can restart metoprolol post procedure
holding antihypertensives in setting of active bleed.
holding antihypertensives in setting of active bleed.

## 2017-02-19 NOTE — PROGRESS NOTE ADULT - SUBJECTIVE AND OBJECTIVE BOX
Aide reports no bleeding with preparation.  He denies abdominal or rectal pain  No external abnormalities at anus.  Awaiting colonoscopy.
At colonoscopy.     MEDICATIONS  (STANDING):  finasteride 5milliGRAM(s) Oral daily  loratadine 10milliGRAM(s) Oral daily  atorvastatin 20milliGRAM(s) Oral at bedtime  dextrose 5% + sodium chloride 0.45%. 1000milliLiter(s) IV Continuous <Continuous>    MEDICATIONS  (PRN):      Vital Signs Last 24 Hrs  T(C): 36.5, Max: 36.7 (02-16 @ 19:46)  T(F): 97.7, Max: 98 (02-16 @ 19:46)  HR: 78 (63 - 78)  BP: 138/70 (120/63 - 138/70)  BP(mean): --  RR: 14 (14 - 17)  SpO2: 96% (94% - 100%)      I&O's Summary      LABS:                        12.6   16.5  )-----------( 331      ( 17 Feb 2017 06:44 )             36.7     17 Feb 2017 06:44    147    |  112    |  20     ----------------------------<  94     4.1     |  26     |  1.29     Ca    8.5        17 Feb 2017 06:44  Mg     2.1       17 Feb 2017 06:44    TPro  5.6    /  Alb  2.7    /  TBili  0.3    /  DBili  x      /  AST  15     /  ALT  22     /  AlkPhos  70     16 Feb 2017 16:32    PT/INR - ( 16 Feb 2017 16:32 )   PT: 12.3 sec;   INR: 1.11          PTT - ( 16 Feb 2017 16:32 )  PTT:30.6 sec    CAPILLARY BLOOD GLUCOSE    LIVER FUNCTIONS - ( 16 Feb 2017 16:32 )  Alb: 2.7 g/dL / Pro: 5.6 g/dL / ALK PHOS: 70 U/L / ALT: 22 U/L / AST: 15 U/L / GGT: x             RADIOLOGY & ADDITIONAL STUDIES:
INTERVAL HPI/OVERNIGHT EVENTS:  MARYLOU overnight. Hg stable, BRB in stool. Patient is confused.    VITAL SIGNS:  T(F): 97.7  HR: 67  BP: 125/74  RR: 16  SpO2: 94%  Wt(kg): --    PHYSICAL EXAM:    Constitutional: chronically ill, frail, demented  Eyes: PERRL, EOMI  ENMT: MMM  Neck: supple, no JVD, no LAD  Respiratory: CTAB  Cardiovascular: irregular, no MRG  Gastrointestinal: soft, NTND, normal BS  Extremities: no edema, WWP  Vascular: 2+ pulses b/l upper and lower extremities  Neurological: A&Ox1, moves all extremities after prompting      MEDICATIONS  (STANDING):  finasteride 5milliGRAM(s) Oral daily  loratadine 10milliGRAM(s) Oral daily  atorvastatin 20milliGRAM(s) Oral at bedtime    MEDICATIONS  (PRN):      Allergies    No Known Allergies    Intolerances        LABS:                        12.6   16.5  )-----------( 331      ( 17 Feb 2017 06:44 )             36.7     17 Feb 2017 06:44    147    |  112    |  20     ----------------------------<  94     4.1     |  26     |  1.29     Ca    8.5        17 Feb 2017 06:44  Mg     2.1       17 Feb 2017 06:44    TPro  5.6    /  Alb  2.7    /  TBili  0.3    /  DBili  x      /  AST  15     /  ALT  22     /  AlkPhos  70     16 Feb 2017 16:32    PT/INR - ( 16 Feb 2017 16:32 )   PT: 12.3 sec;   INR: 1.11          PTT - ( 16 Feb 2017 16:32 )  PTT:30.6 sec      RADIOLOGY & ADDITIONAL TESTS:
INTERVAL HPI/OVERNIGHT EVENTS: no events    VITAL SIGNS:  Vital Signs Last 24 Hrs  T(C): 36.3, Max: 36.7 (-18 @ 16:46)  T(F): 97.4, Max: 98 (-18 @ 16:46)  HR: 70 (62 - 73)  BP: 108/68 (108/68 - 128/73)  BP(mean): --  RR: 15 (15 - 18)  SpO2: 96% (94% - 98%)    PHYSICAL EXAM:    Constitutional: chronically ill, frail, demented  Eyes: PERRL, EOMI  ENMT: MMM  Neck: supple, no JVD, no LAD  Respiratory: CTAB  Cardiovascular: irregular, no MRG  Gastrointestinal: soft, NTND, normal BS  Extremities: no edema, WWP  Vascular: 2+ pulses b/l upper and lower extremities  Neurological: A&Ox1, moves all extremities after prompting    MEDICATIONS  (STANDING):  finasteride 5milliGRAM(s) Oral daily  loratadine 10milliGRAM(s) Oral daily  atorvastatin 20milliGRAM(s) Oral at bedtime  dextrose 5% + sodium chloride 0.45%. 1000milliLiter(s) IV Continuous <Continuous>    MEDICATIONS  (PRN):      Allergies    No Known Allergies    Intolerances        LABS:                        11.6   13.6  )-----------( 260      ( 2017 08:00 )             35.1     2017 08:00    143    |  111    |  21     ----------------------------<  87     3.9     |  23     |  1.50     Ca    8.3        2017 08:00  Mg     2.1       2017 08:00        Urinalysis Basic - ( 2017 09:01 )    Color: Yellow / Appearance: Clear / S.010 / pH: x  Gluc: x / Ketone: NEGATIVE  / Bili: NEGATIVE / Urobili: 0.2 E.U./dL   Blood: x / Protein: NEGATIVE mg/dL / Nitrite: NEGATIVE   Leuk Esterase: NEGATIVE / RBC: Many /HPF / WBC < 5 /HPF   Sq Epi: x / Non Sq Epi: Rare /HPF / Bacteria: Present /HPF        RADIOLOGY & ADDITIONAL TESTS:
Patient is a 89y old  Male who presents with a chief complaint of BRBPR (16 Feb 2017 18:25)    INTERVAL HPI/OVERNIGHT EVENTS:    brbpr last night  no melena  pt seen with aide at bedside  pt is at baseline mental status      ( -  )fevers/chills  ( - ) dyspnea  (  - ) cough  (  - ) chest pain  (  - ) palpatations  ( - ) dizziness/lightheadedness  (  - ) nausea/vomiting  (  - ) abd pain  (  - ) diarrhea  (  - ) dysuria  ( - ) hematuria  (  - ) leg swelling  ( -) calf tenderness  (  - ) motor weakness  (  - ) extremity numbness  ( - ) back pain  ( + ) tolerating POs  ( + ) BM  ROS: 12 point review of systems otherwise negative    MEDICATIONS  (STANDING):  finasteride 5milliGRAM(s) Oral daily  loratadine 10milliGRAM(s) Oral daily  atorvastatin 20milliGRAM(s) Oral at bedtime    MEDICATIONS  (PRN):    Allergies    No Known Allergies    Intolerances        Vital Signs Last 24 Hrs  T(C): 36.5, Max: 36.9 (02-16 @ 14:47)  T(F): 97.7, Max: 98.4 (02-16 @ 14:47)  HR: 78 (61 - 78)  BP: 138/70 (118/68 - 138/70)  BP(mean): --  RR: 14 (14 - 17)  SpO2: 96% (94% - 100%)  CAPILLARY BLOOD GLUCOSE  144 (16 Feb 2017 16:30)    I&O's Summary    Physical Exam:    Daily Height in cm: 182.88 (16 Feb 2017 23:54)    Daily   General:  Well appearing,   HEENT:  Nonicteric, PERRLA, neck supple  CV: S1S2 nml,  RRR,    Lungs:  CTA B/L, no wheezes, rhonchi, rales  Abdomen:  (+) bowel sounds, Soft, non-tender, non distended  Extremities:  2+ DP pulses b/l, no extremity clubbing/cyanosis/ edema  Back: no CVA tenderness, no midline vertebral tenderness  Skin:  stage ii sacral decub  :  No fermin  Neuro:  AAOx person,  masked facies, dysarthric,  remainder of CN II-XII grossly intact ,   4-/5 str all 4 extremities, sensation intact b/l,   No Restraints    LABS:                        12.6   16.5  )-----------( 331      ( 17 Feb 2017 06:44 )             36.7     17 Feb 2017 06:44    147    |  112    |  20     ----------------------------<  94     4.1     |  26     |  1.29     Ca    8.5        17 Feb 2017 06:44  Mg     2.1       17 Feb 2017 06:44    TPro  5.6    /  Alb  2.7    /  TBili  0.3    /  DBili  x      /  AST  15     /  ALT  22     /  AlkPhos  70     16 Feb 2017 16:32    LIVER FUNCTIONS - ( 16 Feb 2017 16:32 )  Alb: 2.7 g/dL / Pro: 5.6 g/dL / ALK PHOS: 70 U/L / ALT: 22 U/L / AST: 15 U/L / GGT: x           PT/INR - ( 16 Feb 2017 16:32 )   PT: 12.3 sec;   INR: 1.11          PTT - ( 16 Feb 2017 16:32 )  PTT:30.6 sec
Patient sleeping during morning rounds. Caregiver bedside denied any complaints.     MEDICATIONS  (STANDING):  finasteride 5milliGRAM(s) Oral daily  loratadine 10milliGRAM(s) Oral daily  atorvastatin 20milliGRAM(s) Oral at bedtime  dextrose 5% + sodium chloride 0.45%. 1000milliLiter(s) IV Continuous <Continuous>    MEDICATIONS  (PRN):      Vital Signs Last 24 Hrs  T(C): 36.7, Max: 36.7 (02-18 @ 04:59)  T(F): 98, Max: 98 (02-18 @ 04:59)  HR: 62 (62 - 74)  BP: 112/74 (100/60 - 145/81)  BP(mean): --  RR: 16 (16 - 17)  SpO2: 97% (95% - 97%)            I&O's Detail  I & Os for 24h ending 18 Feb 2017 07:00  =============================================  IN:    dextrose 5% + sodium chloride 0.45%.: 770 ml    Total IN: 770 ml  ---------------------------------------------  OUT:    Voided: 3 ml    Total OUT: 3 ml  ---------------------------------------------  Total NET: 767 ml    I & Os for current day (as of 18 Feb 2017 19:50)  =============================================  IN:    dextrose 5% + sodium chloride 0.45%.: 840 ml    Total IN: 840 ml  ---------------------------------------------  OUT:    Total OUT: 0 ml  ---------------------------------------------  Total NET: 840 ml      LABS:                        11.1   15.0  )-----------( 322      ( 18 Feb 2017 07:47 )             33.3     18 Feb 2017 07:47    144    |  110    |  21     ----------------------------<  94     3.6     |  26     |  1.38     Ca    8.9        18 Feb 2017 07:47  Mg     2.2       18 Feb 2017 07:47            RADIOLOGY & ADDITIONAL STUDIES:
Pt seen and examined at bedside this am.  Nurse denies any episodes of BRBPR or melena.  Afebrile, HD stable.      MEDICATIONS:  MEDICATIONS  (STANDING):  finasteride 5milliGRAM(s) Oral daily  loratadine 10milliGRAM(s) Oral daily  atorvastatin 20milliGRAM(s) Oral at bedtime  dextrose 5% + sodium chloride 0.45%. 1000milliLiter(s) IV Continuous <Continuous>    MEDICATIONS  (PRN):      Allergies    No Known Allergies    Intolerances        Vital Signs Last 24 Hrs  T(C): 36.7, Max: 36.9 (02-17 @ 17:50)  T(F): 98, Max: 98.5 (02-17 @ 17:50)  HR: 74 (64 - 78)  BP: 120/70 (100/60 - 138/70)  BP(mean): --  RR: 17 (14 - 17)  SpO2: 95% (95% - 98%)  I & Os for 24h ending 02-18 @ 07:00  =============================================  IN: 770 ml / OUT: 3 ml / NET: 767 ml    I & Os for current day (as of 02-18 @ 07:40)  =============================================  IN: 70 ml / OUT: 0 ml / NET: 70 ml      PHYSICAL EXAM:    General: elderly male, demented, NAD  HEENT: MMM, conjunctiva and sclera clear  Gastrointestinal: Soft non-tender non-distended; Normal bowel sounds    LABS: AM LABS PENDING       CBC Full  -  ( 17 Feb 2017 06:44 )  WBC Count : 16.5 K/uL  Hemoglobin : 12.6 g/dL  Hematocrit : 36.7 %  Platelet Count - Automated : 331 K/uL  Mean Cell Volume : 95.1 fL  Mean Cell Hemoglobin : 32.6 pg  Mean Cell Hemoglobin Concentration : 34.3 g/dL  Auto Neutrophil # : x  Auto Lymphocyte # : x  Auto Monocyte # : x  Auto Eosinophil # : x  Auto Basophil # : x  Auto Neutrophil % : x  Auto Lymphocyte % : x  Auto Monocyte % : x  Auto Eosinophil % : x  Auto Basophil % : x    17 Feb 2017 06:44    147    |  112    |  20     ----------------------------<  94     4.1     |  26     |  1.29     Ca    8.5        17 Feb 2017 06:44  Mg     2.1       17 Feb 2017 06:44    TPro  5.6    /  Alb  2.7    /  TBili  0.3    /  DBili  x      /  AST  15     /  ALT  22     /  AlkPhos  70     16 Feb 2017 16:32    PT/INR - ( 16 Feb 2017 16:32 )   PT: 12.3 sec;   INR: 1.11          PTT - ( 16 Feb 2017 16:32 )  PTT:30.6 sec                  RADIOLOGY & ADDITIONAL STUDIES (The following images were personally reviewed):
Patient is a 89y old  Male who presents with a chief complaint of BRBPR (17 Feb 2017 15:21)      INTERVAL HPI/OVERNIGHT EVENTS: No melena or hematochezia.     Review of Systems: 12 point review of systems otherwise negative    MEDICATIONS  (STANDING):  finasteride 5milliGRAM(s) Oral daily  loratadine 10milliGRAM(s) Oral daily  atorvastatin 20milliGRAM(s) Oral at bedtime  dextrose 5% + sodium chloride 0.45%. 1000milliLiter(s) IV Continuous <Continuous>    MEDICATIONS  (PRN):      Allergies    No Known Allergies    Intolerances          Vital Signs Last 24 Hrs  T(C): 36.6, Max: 36.9 (02-17 @ 17:50)  T(F): 97.8, Max: 98.5 (02-17 @ 17:50)  HR: 67 (64 - 74)  BP: 145/81 (100/60 - 145/81)  BP(mean): --  RR: 16 (16 - 17)  SpO2: 97% (95% - 98%)  CAPILLARY BLOOD GLUCOSE    I & Os for 24h ending 02-18 @ 07:00  =============================================  IN: 770 ml / OUT: 3 ml / NET: 767 ml    I & Os for current day (as of 02-18 @ 12:59)  =============================================  IN: 70 ml / OUT: 0 ml / NET: 70 ml      Physical Exam:      General:  NAD  HEENT:  Nonicteric, PERRLA  CV:  RRR currently, no murmur, no JVD  Lungs:  CTA B/L, no wheezes, rales, rhonchi  Abdomen:  Soft, non-tender, no distended, positive BS, no hepatosplenomegaly  Extremities:  2+ pulses, no c/c, no edema  Skin:  Warm and dry, no rashes  :  No fermin  Neuro:  Mask like facies, answers questions appropriately  No Restraints    LABS:                        11.1   15.0  )-----------( 322      ( 18 Feb 2017 07:47 )             33.3     18 Feb 2017 07:47    144    |  110    |  21     ----------------------------<  94     3.6     |  26     |  1.38     Ca    8.9        18 Feb 2017 07:47  Mg     2.2       18 Feb 2017 07:47    TPro  5.6    /  Alb  2.7    /  TBili  0.3    /  DBili  x      /  AST  15     /  ALT  22     /  AlkPhos  70     16 Feb 2017 16:32    PT/INR - ( 16 Feb 2017 16:32 )   PT: 12.3 sec;   INR: 1.11          PTT - ( 16 Feb 2017 16:32 )  PTT:30.6 sec        RADIOLOGY & ADDITIONAL TESTS:    ---------------------------------------------------------------------------  I personally reviewed: [  ]EKG   [  ]CXR    [  ] CT    [  ]Other  ---------------------------------------------------------------------------  PLEASE CHECK WHEN PRESENT:     [  ]Heart Failure     [  ] Acute     [  ] Acute on Chronic     [  ] Chronic  -------------------------------------------------------------------     [  ]Diastolic [HFpEF]     [  ]Systolic [HFrEF]     [  ]Combined [HFpEF & HFrEF]     [  ]Other:  -------------------------------------------------------------------  [  ]TONE     [  ]ATN     [  ]Reneal Medullary Necrosis     [  ]Renal Cortical Necrosis     [  ]Other Pathological Lesions:    [  ]CKD 1  [  ]CKD 2  [  ]CKD 3  [  ]CKD 4  [  ]CKD 5  [  ]Other  -------------------------------------------------------------------  [  ]Other/Unspecified:    --------------------------------------------------------------------    Abdominal Nutritional Status  [  ]Malnutrition: See Nutrition Note  [  ]Cachexia  [  ]Other:   [  ]Supplement Ordered:  [  ]Morbid Obesity (BMI >=40]
Patient is a 89y old  Male who presents with a chief complaint of BRBPR (2017 15:21)      INTERVAL HPI/OVERNIGHT EVENTS: No acute events O/N. Pt more awake and alert today. Wants to go home as soon as he can.     Review of Systems: 12 point review of systems otherwise negative    MEDICATIONS  (STANDING):  finasteride 5milliGRAM(s) Oral daily  loratadine 10milliGRAM(s) Oral daily  atorvastatin 20milliGRAM(s) Oral at bedtime  dextrose 5% + sodium chloride 0.45%. 1000milliLiter(s) IV Continuous <Continuous>    MEDICATIONS  (PRN):      Allergies    No Known Allergies    Intolerances          Vital Signs Last 24 Hrs  T(C): 36.3, Max: 36.7 ( @ 16:46)  T(F): 97.4, Max: 98 ( @ 16:46)  HR: 70 (62 - 73)  BP: 108/68 (108/68 - 128/73)  BP(mean): --  RR: 15 (15 - 18)  SpO2: 96% (94% - 98%)  CAPILLARY BLOOD GLUCOSE    I & Os for 24h ending  @ 07:00  =============================================  IN: 1540 ml / OUT: 350 ml / NET: 1190 ml    I & Os for current day (as of  @ 12:03)  =============================================  IN: 70 ml / OUT: 500 ml / NET: -430 ml      Physical Exam:    General:  NAD  HEENT:  Nonicteric, PERRLA  CV:  RRR currently, no murmur, no JVD  Lungs:  CTA B/L, no wheezes, rales, rhonchi  Abdomen:  Soft, non-tender, no distended, positive BS, no hepatosplenomegaly  Extremities:  2+ pulses, no c/c, no edema  Skin:  Warm and dry, no rashes  Neuro:  Non-focal  No Restraints    LABS:                        11.6   13.6  )-----------( 260      ( 2017 08:00 )             35.1     2017 08:00    143    |  111    |  21     ----------------------------<  87     3.9     |  23     |  1.50     Ca    8.3        2017 08:00  Mg     2.1       2017 08:00        Urinalysis Basic - ( 2017 09:01 )    Color: Yellow / Appearance: Clear / S.010 / pH: x  Gluc: x / Ketone: NEGATIVE  / Bili: NEGATIVE / Urobili: 0.2 E.U./dL   Blood: x / Protein: NEGATIVE mg/dL / Nitrite: NEGATIVE   Leuk Esterase: NEGATIVE / RBC: Many /HPF / WBC < 5 /HPF   Sq Epi: x / Non Sq Epi: Rare /HPF / Bacteria: Present /HPF          RADIOLOGY & ADDITIONAL TESTS:    ---------------------------------------------------------------------------  I personally reviewed: [  ]EKG   [  ]CXR    [  ] CT    [  ]Other  ---------------------------------------------------------------------------  PLEASE CHECK WHEN PRESENT:     [  ]Heart Failure     [  ] Acute     [  ] Acute on Chronic     [  ] Chronic  -------------------------------------------------------------------     [  ]Diastolic [HFpEF]     [  ]Systolic [HFrEF]     [  ]Combined [HFpEF & HFrEF]     [  ]Other:  -------------------------------------------------------------------  [  ]TONE     [  ]ATN     [  ]Reneal Medullary Necrosis     [  ]Renal Cortical Necrosis     [  ]Other Pathological Lesions:    [  ]CKD 1  [  ]CKD 2  [  ]CKD 3  [  ]CKD 4  [  ]CKD 5  [  ]Other  -------------------------------------------------------------------  [  ]Other/Unspecified:    --------------------------------------------------------------------    Abdominal Nutritional Status  [  ]Malnutrition: See Nutrition Note  [  ]Cachexia  [  ]Other:   [  ]Supplement Ordered:  [  ]Morbid Obesity (BMI >=40]

## 2017-02-19 NOTE — PROGRESS NOTE ADULT - PROBLEM SELECTOR PLAN 1
Likely diverticular bleed. Hgb variable but stable. Cont to trend CBC.   Anticoagulation on hold.
Likely diverticular bleed. Hgb variable but stable. Cont to trend CBC.   Anticoagulation on hold.
BRBPR with stable Hg. Diverticular vs. AVM vs. Hemmorhoid most likely.    - Stable Hg  - Transfuse if Hgb <7. Maintain active T&S  - Completed prep overnight. Plan for colonoscopy with GI today
BRBPR with stable Hg. Diverticular vs. AVM vs. Hemmorhoid most likely. GI and surgery consulted. Pt DNR/DNI, will reverse for procedure but does not want surgery only colonoscopy.  - Stable Hg, check CBC once daily or PRN for blood loss  - Transfuse if Hgb <7. Maintain active T&S  - Completed prep overnight. Plan for colonoscopy with GI today
suspect Diverticular bleeding  for colonoscopy today  hb stable  dc'd anticoagulation

## 2017-02-19 NOTE — PROGRESS NOTE ADULT - PROBLEM SELECTOR PLAN 9
HCP is niece. Living will scanned into alpha. low salt diet, no IVF, replete lytes prn    DNR/DNI    dispo: pending clinical improvement

## 2017-02-20 VITALS
TEMPERATURE: 98 F | RESPIRATION RATE: 17 BRPM | SYSTOLIC BLOOD PRESSURE: 116 MMHG | OXYGEN SATURATION: 98 % | HEART RATE: 74 BPM | DIASTOLIC BLOOD PRESSURE: 68 MMHG

## 2017-02-20 LAB
ANION GAP SERPL CALC-SCNC: 9 MMOL/L — SIGNIFICANT CHANGE UP (ref 9–16)
BUN SERPL-MCNC: 21 MG/DL — SIGNIFICANT CHANGE UP (ref 7–23)
CALCIUM SERPL-MCNC: 8.6 MG/DL — SIGNIFICANT CHANGE UP (ref 8.5–10.5)
CHLORIDE SERPL-SCNC: 112 MMOL/L — HIGH (ref 96–108)
CO2 SERPL-SCNC: 23 MMOL/L — SIGNIFICANT CHANGE UP (ref 22–31)
CREAT SERPL-MCNC: 1.36 MG/DL — HIGH (ref 0.5–1.3)
GLUCOSE SERPL-MCNC: 80 MG/DL — SIGNIFICANT CHANGE UP (ref 70–99)
HCT VFR BLD CALC: 35.9 % — LOW (ref 39–50)
HGB BLD-MCNC: 11.8 G/DL — LOW (ref 13–17)
MAGNESIUM SERPL-MCNC: 2 MG/DL — SIGNIFICANT CHANGE UP (ref 1.6–2.4)
MCHC RBC-ENTMCNC: 30.7 PG — SIGNIFICANT CHANGE UP (ref 27–34)
MCHC RBC-ENTMCNC: 32.9 G/DL — SIGNIFICANT CHANGE UP (ref 32–36)
MCV RBC AUTO: 93.5 FL — SIGNIFICANT CHANGE UP (ref 80–100)
PHOSPHATE SERPL-MCNC: 3 MG/DL — SIGNIFICANT CHANGE UP (ref 2.5–4.5)
PLATELET # BLD AUTO: 240 K/UL — SIGNIFICANT CHANGE UP (ref 150–400)
POTASSIUM SERPL-MCNC: 4 MMOL/L — SIGNIFICANT CHANGE UP (ref 3.5–5.3)
POTASSIUM SERPL-SCNC: 4 MMOL/L — SIGNIFICANT CHANGE UP (ref 3.5–5.3)
RBC # BLD: 3.84 M/UL — LOW (ref 4.2–5.8)
RBC # FLD: 14.1 % — SIGNIFICANT CHANGE UP (ref 10.3–16.9)
SODIUM SERPL-SCNC: 144 MMOL/L — SIGNIFICANT CHANGE UP (ref 135–145)
WBC # BLD: 9.7 K/UL — SIGNIFICANT CHANGE UP (ref 3.8–10.5)
WBC # FLD AUTO: 9.7 K/UL — SIGNIFICANT CHANGE UP (ref 3.8–10.5)

## 2017-02-20 PROCEDURE — 86850 RBC ANTIBODY SCREEN: CPT

## 2017-02-20 PROCEDURE — 99239 HOSP IP/OBS DSCHRG MGMT >30: CPT

## 2017-02-20 PROCEDURE — 85027 COMPLETE CBC AUTOMATED: CPT

## 2017-02-20 PROCEDURE — 85610 PROTHROMBIN TIME: CPT

## 2017-02-20 PROCEDURE — 86900 BLOOD TYPING SEROLOGIC ABO: CPT

## 2017-02-20 PROCEDURE — 76775 US EXAM ABDO BACK WALL LIM: CPT | Mod: 26

## 2017-02-20 PROCEDURE — 84300 ASSAY OF URINE SODIUM: CPT

## 2017-02-20 PROCEDURE — 86901 BLOOD TYPING SEROLOGIC RH(D): CPT

## 2017-02-20 PROCEDURE — 85730 THROMBOPLASTIN TIME PARTIAL: CPT

## 2017-02-20 PROCEDURE — 99285 EMERGENCY DEPT VISIT HI MDM: CPT | Mod: 25

## 2017-02-20 PROCEDURE — 84100 ASSAY OF PHOSPHORUS: CPT

## 2017-02-20 PROCEDURE — 83735 ASSAY OF MAGNESIUM: CPT

## 2017-02-20 PROCEDURE — 36415 COLL VENOUS BLD VENIPUNCTURE: CPT

## 2017-02-20 PROCEDURE — 80053 COMPREHEN METABOLIC PANEL: CPT

## 2017-02-20 PROCEDURE — 93005 ELECTROCARDIOGRAM TRACING: CPT

## 2017-02-20 PROCEDURE — 71045 X-RAY EXAM CHEST 1 VIEW: CPT

## 2017-02-20 PROCEDURE — 82570 ASSAY OF URINE CREATININE: CPT

## 2017-02-20 PROCEDURE — 81003 URINALYSIS AUTO W/O SCOPE: CPT

## 2017-02-20 PROCEDURE — 81001 URINALYSIS AUTO W/SCOPE: CPT

## 2017-02-20 PROCEDURE — 80048 BASIC METABOLIC PNL TOTAL CA: CPT

## 2017-02-20 RX ADMIN — SODIUM CHLORIDE 70 MILLILITER(S): 9 INJECTION, SOLUTION INTRAVENOUS at 05:49

## 2017-02-20 RX ADMIN — FINASTERIDE 5 MILLIGRAM(S): 5 TABLET, FILM COATED ORAL at 11:21

## 2017-02-20 RX ADMIN — LORATADINE 10 MILLIGRAM(S): 10 TABLET ORAL at 11:21

## 2017-02-20 NOTE — DIETITIAN INITIAL EVALUATION ADULT. - ENERGY NEEDS
Height: 6 feet 0 inches, Weight (Current): 112 pounds,  pounds +/-10%, %IBW %63, BMI 15.3    IBW used to calculate energy needs due to pt's current body weight is less than % of IBW   EER based on wt loss in older adult, PU.

## 2017-02-20 NOTE — DIETITIAN INITIAL EVALUATION ADULT. - NS AS NUTRI INTERV MEALS SNACK
Recommend for SLP to see pt to confirm proper consistency of PO diet- if PO diet Is medically feasible recommend regular diet with Suplena x3 per day (1275kcal, 30gm prot)- consistency per SLP. If PO diet is not medically feasible recommended alternate means of nutrition.

## 2017-02-20 NOTE — DIETITIAN INITIAL EVALUATION ADULT. - OTHER INFO
pt with hx of HTN, CKD 3 and Stage II prostate CA is now admitted for lower GI bleed- diverticular vs. AVM vs. hemorrhoid- most likely. pt noted with TONE. HHA @ bedside presented as having a hard time recall wt hx. HHA reports that pt has lost wt of the last 2 years; pt weighed ~170 pounds x 2 years ago. Aide reports pt has lost wt 2/2 decreased PO intake. A reports more recent wt of 152 pounds as of just last week. pt is currently noted with wt of 112 pounds per EMR @ this time. RD obtained bedscale wt of 155.5 pounds during time of IA- this suggests pt is wt loss of ~54.5-58 pounds x 2 years, 31-34% body wt loss; question accuracy of stated UBW vs bedscale wt and will continue to monitor. pt is currently on [facility] soft DASH diet and has been with improved PO intake since admission, consuming ~75% of meals. A is agreeable to ONS for pt. NKFA. pt noted with Stage II Sacrum PU & 2+ arm edema @ this time. High Nutrition Risk.

## 2017-02-20 NOTE — DIETITIAN INITIAL EVALUATION ADULT. - PROBLEM SELECTOR PLAN 1
3 episodes BRBPR today. Last colonoscopy unknown. With bright blood in rectal vault, possible hemorrhoidal bleed vs diverticular bleed vs AVM. Unlikely brisk upper bleed, patient hemodynamically stable at this time.     GI and surgery consulted in ED. Plan for colonoscopy tomorrow. HCP (niece) not amenable to surgery. DNR will be reversed for procedure tomorrow.  - will monitor CBC q8h. Transfuse if Hgb <7. Maintain active T&S  - check orthostatics  - received IV fluids in ED  - clear liquids this evening and to start GoLytely prep until clear stools, enemas if needed. NPO after midnight for scope

## 2017-02-20 NOTE — DIETITIAN INITIAL EVALUATION ADULT. - ORAL INTAKE PTA
pt has been consuming 3 small portioned meals/day with Ensure 1-2x per day. pt will usually consume foods which are pureed 2/2 having issues swallowing. HHA reports they will also use thickener for liquids.

## 2017-02-20 NOTE — DIETITIAN INITIAL EVALUATION ADULT. - PROBLEM SELECTOR PLAN 2
Rate controlled. Will hold Metoprolol in setting of active bleeding. Holding Xarelto and ASA in setting of bleed.

## 2017-02-22 DIAGNOSIS — N18.3 CHRONIC KIDNEY DISEASE, STAGE 3 (MODERATE): ICD-10-CM

## 2017-02-22 DIAGNOSIS — Z66 DO NOT RESUSCITATE: ICD-10-CM

## 2017-02-22 DIAGNOSIS — N40.0 BENIGN PROSTATIC HYPERPLASIA WITHOUT LOWER URINARY TRACT SYMPTOMS: ICD-10-CM

## 2017-02-22 DIAGNOSIS — I25.10 ATHEROSCLEROTIC HEART DISEASE OF NATIVE CORONARY ARTERY WITHOUT ANGINA PECTORIS: ICD-10-CM

## 2017-02-22 DIAGNOSIS — K57.90 DIVERTICULOSIS OF INTESTINE, PART UNSPECIFIED, WITHOUT PERFORATION OR ABSCESS WITHOUT BLEEDING: ICD-10-CM

## 2017-02-22 DIAGNOSIS — I45.2 BIFASCICULAR BLOCK: ICD-10-CM

## 2017-02-22 DIAGNOSIS — K57.91 DIVERTICULOSIS OF INTESTINE, PART UNSPECIFIED, WITHOUT PERFORATION OR ABSCESS WITH BLEEDING: ICD-10-CM

## 2017-02-22 DIAGNOSIS — Z95.0 PRESENCE OF CARDIAC PACEMAKER: ICD-10-CM

## 2017-02-22 DIAGNOSIS — I12.9 HYPERTENSIVE CHRONIC KIDNEY DISEASE WITH STAGE 1 THROUGH STAGE 4 CHRONIC KIDNEY DISEASE, OR UNSPECIFIED CHRONIC KIDNEY DISEASE: ICD-10-CM

## 2017-02-22 DIAGNOSIS — N32.3 DIVERTICULUM OF BLADDER: ICD-10-CM

## 2017-02-22 DIAGNOSIS — Z79.01 LONG TERM (CURRENT) USE OF ANTICOAGULANTS: ICD-10-CM

## 2017-02-22 DIAGNOSIS — N17.9 ACUTE KIDNEY FAILURE, UNSPECIFIED: ICD-10-CM

## 2017-02-22 DIAGNOSIS — F03.90 UNSPECIFIED DEMENTIA, UNSPECIFIED SEVERITY, WITHOUT BEHAVIORAL DISTURBANCE, PSYCHOTIC DISTURBANCE, MOOD DISTURBANCE, AND ANXIETY: ICD-10-CM

## 2017-02-22 DIAGNOSIS — C61 MALIGNANT NEOPLASM OF PROSTATE: ICD-10-CM

## 2017-02-22 DIAGNOSIS — Z79.82 LONG TERM (CURRENT) USE OF ASPIRIN: ICD-10-CM

## 2017-02-22 DIAGNOSIS — J98.11 ATELECTASIS: ICD-10-CM

## 2017-02-22 DIAGNOSIS — E78.5 HYPERLIPIDEMIA, UNSPECIFIED: ICD-10-CM

## 2017-02-22 DIAGNOSIS — D12.2 BENIGN NEOPLASM OF ASCENDING COLON: ICD-10-CM

## 2017-02-22 DIAGNOSIS — D12.3 BENIGN NEOPLASM OF TRANSVERSE COLON: ICD-10-CM

## 2017-02-22 DIAGNOSIS — I42.9 CARDIOMYOPATHY, UNSPECIFIED: ICD-10-CM

## 2017-02-22 DIAGNOSIS — L89.152 PRESSURE ULCER OF SACRAL REGION, STAGE 2: ICD-10-CM

## 2017-02-22 DIAGNOSIS — K92.1 MELENA: ICD-10-CM

## 2017-02-22 DIAGNOSIS — I48.2 CHRONIC ATRIAL FIBRILLATION: ICD-10-CM

## 2017-02-22 DIAGNOSIS — N21.0 CALCULUS IN BLADDER: ICD-10-CM

## 2021-01-15 NOTE — ED ADULT TRIAGE NOTE - NS ED NURSE BANDS TYPE
COVID-19 Week 1 Survey      Responses   [facility] patient discharged from?  [facility]   Does the patient have one of the following disease processes/diagnoses(primary or secondary)?  COVID-19   COVID-19 underlying condition?  None   Call Number  Call 1   Week 1 Call successful?  No          Rosa Maria Shaikh RN  
Name band;

## 2023-03-02 NOTE — ED ADULT NURSE NOTE - THOUGHTS OF SUICIDE/SELF-HARM YN, MLM
No Hypertriglyceridemia Monitoring: I explained this is common when taking isotretinoin. We will monitor closely.

## 2024-08-07 NOTE — ED PROVIDER NOTE - OBJECTIVE STATEMENT
89 yo male h/o cardiomyopathy, cad, s/p pacer, afib on xarelto, htn, hld, prostate ca, dementia c/o brbpr this am, noted to have heavy bleeding at pmd's office.  Per hha, pt w similar bleeding 1 mo ago, none since; pt denies recent colonoscopy.  No abd or rectal pain, fever, n/v, other bleeding.  No cp, palpitations, sob, dizziness, weakness.  No h/o gi bleed prior to 1 mo ago. [Negative] : Heme/Lymph

## 2025-06-27 NOTE — PROGRESS NOTE ADULT - PROBLEM SELECTOR PLAN 2
Rate controlled.   holding Xarelto and ASA in setting of bleed.
Rate controlled.   holding Xarelto and ASA in setting of bleed.
Rate controlled.   can restart Metoprolol post procedure  holding Xarelto and ASA in setting of bleed.
Rate controlled. Will hold Metoprolol in setting of active bleeding. Holding Xarelto and ASA in setting of bleed.
Rate controlled. Will hold Metoprolol in setting of active bleeding. Holding Xarelto and ASA, will likely stop post discharge
Yes